# Patient Record
Sex: FEMALE | Race: BLACK OR AFRICAN AMERICAN | Employment: UNEMPLOYED | URBAN - METROPOLITAN AREA
[De-identification: names, ages, dates, MRNs, and addresses within clinical notes are randomized per-mention and may not be internally consistent; named-entity substitution may affect disease eponyms.]

---

## 2017-09-14 ENCOUNTER — HOSPITAL ENCOUNTER (EMERGENCY)
Age: 22
Discharge: HOME | End: 2017-09-14
Attending: EMERGENCY MEDICINE
Payer: COMMERCIAL

## 2017-09-14 VITALS
HEART RATE: 85 BPM | SYSTOLIC BLOOD PRESSURE: 140 MMHG | TEMPERATURE: 97.9 F | OXYGEN SATURATION: 100 % | RESPIRATION RATE: 12 BRPM | DIASTOLIC BLOOD PRESSURE: 95 MMHG

## 2017-09-14 DIAGNOSIS — N76.0 BACTERIAL VAGINOSIS: Primary | ICD-10-CM

## 2017-09-14 DIAGNOSIS — B96.89 BACTERIAL VAGINOSIS: Primary | ICD-10-CM

## 2017-09-14 LAB
-: ABNORMAL
AMORPHOUS: ABNORMAL
BACTERIA: ABNORMAL
BILIRUBIN URINE: NEGATIVE
CASTS UA: ABNORMAL /LPF (ref 0–2)
COLOR: YELLOW
CRYSTALS, UA: ABNORMAL /HPF
DIRECT EXAM: ABNORMAL
EPITHELIAL CELLS UA: ABNORMAL /HPF (ref 0–5)
GLUCOSE URINE: NEGATIVE
HCG(URINE) PREGNANCY TEST: NEGATIVE
KETONES, URINE: ABNORMAL
LEUKOCYTE ESTERASE, URINE: ABNORMAL
Lab: ABNORMAL
MUCUS: ABNORMAL
NITRITE, URINE: NEGATIVE
OTHER OBSERVATIONS UA: ABNORMAL
PH UA: 5.5 (ref 5–8)
PROTEIN UA: NEGATIVE
RBC UA: ABNORMAL /HPF (ref 0–2)
RENAL EPITHELIAL, UA: ABNORMAL /HPF
SPECIFIC GRAVITY UA: 1.03 (ref 1–1.03)
SPECIMEN DESCRIPTION: ABNORMAL
STATUS: ABNORMAL
TRICHOMONAS: ABNORMAL
TURBIDITY: ABNORMAL
URINE HGB: NEGATIVE
UROBILINOGEN, URINE: NORMAL
WBC UA: ABNORMAL /HPF (ref 0–5)
YEAST: ABNORMAL

## 2017-09-14 PROCEDURE — 87660 TRICHOMONAS VAGIN DIR PROBE: CPT

## 2017-09-14 PROCEDURE — 87510 GARDNER VAG DNA DIR PROBE: CPT

## 2017-09-14 PROCEDURE — 87480 CANDIDA DNA DIR PROBE: CPT

## 2017-09-14 PROCEDURE — 87591 N.GONORRHOEAE DNA AMP PROB: CPT

## 2017-09-14 PROCEDURE — 84703 CHORIONIC GONADOTROPIN ASSAY: CPT

## 2017-09-14 PROCEDURE — 81001 URINALYSIS AUTO W/SCOPE: CPT

## 2017-09-14 PROCEDURE — G0382 LEV 3 HOSP TYPE B ED VISIT: HCPCS

## 2017-09-14 PROCEDURE — 87491 CHLMYD TRACH DNA AMP PROBE: CPT

## 2017-09-14 RX ORDER — METRONIDAZOLE 500 MG/1
500 TABLET ORAL 2 TIMES DAILY
Qty: 14 TABLET | Refills: 0 | Status: SHIPPED | OUTPATIENT
Start: 2017-09-14 | End: 2017-09-21

## 2017-09-14 ASSESSMENT — ENCOUNTER SYMPTOMS
COLOR CHANGE: 0
BACK PAIN: 0
DIARRHEA: 0
NAUSEA: 0
ABDOMINAL PAIN: 0
SHORTNESS OF BREATH: 0
COUGH: 0
VOMITING: 0

## 2017-09-15 LAB
C TRACH DNA GENITAL QL NAA+PROBE: NEGATIVE
N. GONORRHOEAE DNA: NEGATIVE

## 2022-09-10 ENCOUNTER — HOSPITAL ENCOUNTER (INPATIENT)
Age: 27
LOS: 2 days | Discharge: HOME | DRG: 560 | End: 2022-09-12
Attending: OBSTETRICS & GYNECOLOGY | Admitting: OBSTETRICS & GYNECOLOGY
Payer: COMMERCIAL

## 2022-09-10 PROBLEM — O36.4XX0 FETAL DEMISE > 22 WEEKS, DELIVERED, CURRENT HOSPITALIZATION: Status: ACTIVE | Noted: 2022-09-10

## 2022-09-10 PROBLEM — O14.90 PREECLAMPSIA: Status: ACTIVE | Noted: 2022-09-10

## 2022-09-10 LAB
ABO/RH: NORMAL
ABSOLUTE EOS #: 0.29 K/UL (ref 0–0.44)
ABSOLUTE IMMATURE GRANULOCYTE: <0.03 K/UL (ref 0–0.3)
ABSOLUTE LYMPH #: 2.49 K/UL (ref 1.1–3.7)
ABSOLUTE MONO #: 0.54 K/UL (ref 0.1–1.2)
ALBUMIN SERPL-MCNC: 3.9 G/DL (ref 3.5–5.2)
ALBUMIN/GLOBULIN RATIO: 1.1 (ref 1–2.5)
ALP BLD-CCNC: 98 U/L (ref 35–104)
ALT SERPL-CCNC: 11 U/L (ref 5–33)
AMPHETAMINE SCREEN URINE: NEGATIVE
ANION GAP SERPL CALCULATED.3IONS-SCNC: 10 MMOL/L (ref 9–17)
ANTIBODY SCREEN: NEGATIVE
ARM BAND NUMBER: NORMAL
AST SERPL-CCNC: 17 U/L
BARBITURATE SCREEN URINE: NEGATIVE
BASOPHILS # BLD: 1 % (ref 0–2)
BASOPHILS ABSOLUTE: 0.04 K/UL (ref 0–0.2)
BENZODIAZEPINE SCREEN, URINE: NEGATIVE
BILIRUB SERPL-MCNC: 0.3 MG/DL (ref 0.3–1.2)
BUN BLDV-MCNC: 6 MG/DL (ref 6–20)
CALCIUM SERPL-MCNC: 9.3 MG/DL (ref 8.6–10.4)
CANNABINOID SCREEN URINE: POSITIVE
CHLORIDE BLD-SCNC: 102 MMOL/L (ref 98–107)
CO2: 24 MMOL/L (ref 20–31)
COCAINE METABOLITE, URINE: NEGATIVE
CREAT SERPL-MCNC: 0.56 MG/DL (ref 0.5–0.9)
CREATININE URINE: 139.7 MG/DL (ref 28–217)
EOSINOPHILS RELATIVE PERCENT: 4 % (ref 1–4)
EXPIRATION DATE: NORMAL
FENTANYL URINE: NEGATIVE
FIBRINOGEN: 407 MG/DL (ref 140–420)
GFR AFRICAN AMERICAN: >60 ML/MIN
GFR NON-AFRICAN AMERICAN: >60 ML/MIN
GFR SERPL CREATININE-BSD FRML MDRD: NORMAL ML/MIN/{1.73_M2}
GLUCOSE BLD-MCNC: 85 MG/DL (ref 70–99)
HCT VFR BLD CALC: 32.5 % (ref 36.3–47.1)
HEMOGLOBIN: 11.3 G/DL (ref 11.9–15.1)
HEPATITIS B SURFACE ANTIGEN: NONREACTIVE
HEPATITIS C ANTIBODY: NONREACTIVE
HIV AG/AB: NONREACTIVE
IMMATURE GRANULOCYTES: 0 %
INR BLD: 0.9
LYMPHOCYTES # BLD: 30 % (ref 24–43)
MCH RBC QN AUTO: 31.7 PG (ref 25.2–33.5)
MCHC RBC AUTO-ENTMCNC: 34.8 G/DL (ref 28.4–34.8)
MCV RBC AUTO: 91.3 FL (ref 82.6–102.9)
METHADONE SCREEN, URINE: NEGATIVE
MONOCYTES # BLD: 7 % (ref 3–12)
NRBC AUTOMATED: 0 PER 100 WBC
OPIATES, URINE: NEGATIVE
OXYCODONE SCREEN URINE: NEGATIVE
PARTIAL THROMBOPLASTIN TIME: 25.6 SEC (ref 20.5–30.5)
PDW BLD-RTO: 14.8 % (ref 11.8–14.4)
PHENCYCLIDINE, URINE: NEGATIVE
PLATELET # BLD: 209 K/UL (ref 138–453)
PMV BLD AUTO: 10.2 FL (ref 8.1–13.5)
POTASSIUM SERPL-SCNC: 4 MMOL/L (ref 3.7–5.3)
PROTHROMBIN TIME: 9.4 SEC (ref 9.1–12.3)
RBC # BLD: 3.56 M/UL (ref 3.95–5.11)
RBC # BLD: ABNORMAL 10*6/UL
REASON FOR REJECTION: NORMAL
RUBV IGG SER QL: 325.8 IU/ML
SEG NEUTROPHILS: 58 % (ref 36–65)
SEGMENTED NEUTROPHILS ABSOLUTE COUNT: 4.88 K/UL (ref 1.5–8.1)
SODIUM BLD-SCNC: 136 MMOL/L (ref 135–144)
T. PALLIDUM, IGG: NONREACTIVE
TEST INFORMATION: ABNORMAL
TOTAL PROTEIN, URINE: 16 MG/DL
TOTAL PROTEIN: 7.5 G/DL (ref 6.4–8.3)
URINE TOTAL PROTEIN CREATININE RATIO: 0.11 (ref 0–0.2)
WBC # BLD: 8.3 K/UL (ref 3.5–11.3)
ZZ NTE CLEAN UP: ORDERED TEST: NORMAL
ZZ NTE WITH NAME CLEAN UP: SPECIMEN SOURCE: NORMAL

## 2022-09-10 PROCEDURE — 86803 HEPATITIS C AB TEST: CPT

## 2022-09-10 PROCEDURE — 86850 RBC ANTIBODY SCREEN: CPT

## 2022-09-10 PROCEDURE — 84156 ASSAY OF PROTEIN URINE: CPT

## 2022-09-10 PROCEDURE — 59200 INSERT CERVICAL DILATOR: CPT

## 2022-09-10 PROCEDURE — 85610 PROTHROMBIN TIME: CPT

## 2022-09-10 PROCEDURE — 80307 DRUG TEST PRSMV CHEM ANLYZR: CPT

## 2022-09-10 PROCEDURE — 6360000002 HC RX W HCPCS

## 2022-09-10 PROCEDURE — 80053 COMPREHEN METABOLIC PANEL: CPT

## 2022-09-10 PROCEDURE — 87389 HIV-1 AG W/HIV-1&-2 AB AG IA: CPT

## 2022-09-10 PROCEDURE — 85025 COMPLETE CBC W/AUTO DIFF WBC: CPT

## 2022-09-10 PROCEDURE — 85730 THROMBOPLASTIN TIME PARTIAL: CPT

## 2022-09-10 PROCEDURE — 96374 THER/PROPH/DIAG INJ IV PUSH: CPT

## 2022-09-10 PROCEDURE — 86901 BLOOD TYPING SEROLOGIC RH(D): CPT

## 2022-09-10 PROCEDURE — 87340 HEPATITIS B SURFACE AG IA: CPT

## 2022-09-10 PROCEDURE — 96375 TX/PRO/DX INJ NEW DRUG ADDON: CPT

## 2022-09-10 PROCEDURE — 86762 RUBELLA ANTIBODY: CPT

## 2022-09-10 PROCEDURE — 6370000000 HC RX 637 (ALT 250 FOR IP): Performed by: STUDENT IN AN ORGANIZED HEALTH CARE EDUCATION/TRAINING PROGRAM

## 2022-09-10 PROCEDURE — 82570 ASSAY OF URINE CREATININE: CPT

## 2022-09-10 PROCEDURE — 86780 TREPONEMA PALLIDUM: CPT

## 2022-09-10 PROCEDURE — 6370000000 HC RX 637 (ALT 250 FOR IP)

## 2022-09-10 PROCEDURE — 96376 TX/PRO/DX INJ SAME DRUG ADON: CPT

## 2022-09-10 PROCEDURE — 85384 FIBRINOGEN ACTIVITY: CPT

## 2022-09-10 PROCEDURE — 86900 BLOOD TYPING SEROLOGIC ABO: CPT

## 2022-09-10 PROCEDURE — 2580000003 HC RX 258

## 2022-09-10 PROCEDURE — 1220000000 HC SEMI PRIVATE OB R&B

## 2022-09-10 RX ORDER — CALCIUM GLUCONATE 94 MG/ML
1000 INJECTION, SOLUTION INTRAVENOUS PRN
Status: DISCONTINUED | OUTPATIENT
Start: 2022-09-10 | End: 2022-09-11

## 2022-09-10 RX ORDER — MISOPROSTOL 100 UG/1
400 TABLET ORAL ONCE
Status: COMPLETED | OUTPATIENT
Start: 2022-09-10 | End: 2022-09-10

## 2022-09-10 RX ORDER — MAGNESIUM SULFATE HEPTAHYDRATE 40 MG/ML
4000 INJECTION, SOLUTION INTRAVENOUS ONCE
Status: COMPLETED | OUTPATIENT
Start: 2022-09-10 | End: 2022-09-10

## 2022-09-10 RX ORDER — SODIUM CHLORIDE, SODIUM LACTATE, POTASSIUM CHLORIDE, CALCIUM CHLORIDE 600; 310; 30; 20 MG/100ML; MG/100ML; MG/100ML; MG/100ML
INJECTION, SOLUTION INTRAVENOUS CONTINUOUS
Status: DISCONTINUED | OUTPATIENT
Start: 2022-09-10 | End: 2022-09-11

## 2022-09-10 RX ORDER — NIFEDIPINE 30 MG/1
30 TABLET, EXTENDED RELEASE ORAL DAILY
Status: DISCONTINUED | OUTPATIENT
Start: 2022-09-10 | End: 2022-09-10

## 2022-09-10 RX ORDER — ONDANSETRON 2 MG/ML
4 INJECTION INTRAMUSCULAR; INTRAVENOUS EVERY 6 HOURS PRN
Status: DISCONTINUED | OUTPATIENT
Start: 2022-09-10 | End: 2022-09-11

## 2022-09-10 RX ORDER — SODIUM CHLORIDE 0.9 % (FLUSH) 0.9 %
5-40 SYRINGE (ML) INJECTION EVERY 12 HOURS SCHEDULED
Status: DISCONTINUED | OUTPATIENT
Start: 2022-09-10 | End: 2022-09-11

## 2022-09-10 RX ORDER — ACETAMINOPHEN 500 MG
1000 TABLET ORAL EVERY 6 HOURS PRN
Status: DISCONTINUED | OUTPATIENT
Start: 2022-09-10 | End: 2022-09-11

## 2022-09-10 RX ORDER — NIFEDIPINE 60 MG/1
60 TABLET, FILM COATED, EXTENDED RELEASE ORAL EVERY 24 HOURS
Status: DISCONTINUED | OUTPATIENT
Start: 2022-09-11 | End: 2022-09-11

## 2022-09-10 RX ORDER — METHYLERGONOVINE MALEATE 0.2 MG/ML
200 INJECTION INTRAVENOUS PRN
Status: CANCELLED | OUTPATIENT
Start: 2022-09-10

## 2022-09-10 RX ORDER — MAGNESIUM SULFATE HEPTAHYDRATE 40 MG/ML
INJECTION, SOLUTION INTRAVENOUS
Status: COMPLETED
Start: 2022-09-10 | End: 2022-09-10

## 2022-09-10 RX ORDER — KETOROLAC TROMETHAMINE 30 MG/ML
30 INJECTION, SOLUTION INTRAMUSCULAR; INTRAVENOUS ONCE
Status: COMPLETED | OUTPATIENT
Start: 2022-09-10 | End: 2022-09-10

## 2022-09-10 RX ORDER — LIDOCAINE HYDROCHLORIDE 10 MG/ML
30 INJECTION, SOLUTION EPIDURAL; INFILTRATION; INTRACAUDAL; PERINEURAL PRN
Status: DISCONTINUED | OUTPATIENT
Start: 2022-09-10 | End: 2022-09-11

## 2022-09-10 RX ORDER — NALBUPHINE HYDROCHLORIDE 20 MG/ML
10 INJECTION, SOLUTION INTRAMUSCULAR; INTRAVENOUS; SUBCUTANEOUS ONCE
Status: COMPLETED | OUTPATIENT
Start: 2022-09-10 | End: 2022-09-10

## 2022-09-10 RX ORDER — CARBOPROST TROMETHAMINE 250 UG/ML
250 INJECTION, SOLUTION INTRAMUSCULAR PRN
Status: CANCELLED | OUTPATIENT
Start: 2022-09-10

## 2022-09-10 RX ORDER — SODIUM CHLORIDE 9 MG/ML
25 INJECTION, SOLUTION INTRAVENOUS PRN
Status: DISCONTINUED | OUTPATIENT
Start: 2022-09-10 | End: 2022-09-11

## 2022-09-10 RX ORDER — SODIUM CHLORIDE 0.9 % (FLUSH) 0.9 %
5-40 SYRINGE (ML) INJECTION PRN
Status: DISCONTINUED | OUTPATIENT
Start: 2022-09-10 | End: 2022-09-11

## 2022-09-10 RX ORDER — MISOPROSTOL 100 UG/1
800 TABLET ORAL PRN
Status: CANCELLED | OUTPATIENT
Start: 2022-09-10

## 2022-09-10 RX ORDER — NIFEDIPINE 10 MG/1
10 CAPSULE ORAL ONCE
Status: COMPLETED | OUTPATIENT
Start: 2022-09-10 | End: 2022-09-10

## 2022-09-10 RX ORDER — TRANEXAMIC ACID 10 MG/ML
1000 INJECTION, SOLUTION INTRAVENOUS
Status: ACTIVE | OUTPATIENT
Start: 2022-09-10 | End: 2022-09-10

## 2022-09-10 RX ORDER — SODIUM CHLORIDE, SODIUM LACTATE, POTASSIUM CHLORIDE, AND CALCIUM CHLORIDE .6; .31; .03; .02 G/100ML; G/100ML; G/100ML; G/100ML
500 INJECTION, SOLUTION INTRAVENOUS PRN
Status: DISCONTINUED | OUTPATIENT
Start: 2022-09-10 | End: 2022-09-11

## 2022-09-10 RX ORDER — SODIUM CHLORIDE, SODIUM LACTATE, POTASSIUM CHLORIDE, AND CALCIUM CHLORIDE .6; .31; .03; .02 G/100ML; G/100ML; G/100ML; G/100ML
1000 INJECTION, SOLUTION INTRAVENOUS PRN
Status: DISCONTINUED | OUTPATIENT
Start: 2022-09-10 | End: 2022-09-11

## 2022-09-10 RX ADMIN — MAGNESIUM SULFATE HEPTAHYDRATE 4000 MG: 40 INJECTION, SOLUTION INTRAVENOUS at 13:33

## 2022-09-10 RX ADMIN — MAGNESIUM SULFATE HEPTAHYDRATE 2000 MG/HR: 40 INJECTION, SOLUTION INTRAVENOUS at 23:40

## 2022-09-10 RX ADMIN — ONDANSETRON 4 MG: 2 INJECTION INTRAMUSCULAR; INTRAVENOUS at 23:13

## 2022-09-10 RX ADMIN — KETOROLAC TROMETHAMINE 30 MG: 30 INJECTION, SOLUTION INTRAMUSCULAR; INTRAVENOUS at 23:42

## 2022-09-10 RX ADMIN — SODIUM CHLORIDE, POTASSIUM CHLORIDE, SODIUM LACTATE AND CALCIUM CHLORIDE: 600; 310; 30; 20 INJECTION, SOLUTION INTRAVENOUS at 13:22

## 2022-09-10 RX ADMIN — SODIUM CHLORIDE, POTASSIUM CHLORIDE, SODIUM LACTATE AND CALCIUM CHLORIDE: 600; 310; 30; 20 INJECTION, SOLUTION INTRAVENOUS at 13:38

## 2022-09-10 RX ADMIN — MISOPROSTOL 400 MCG: 100 TABLET ORAL at 15:18

## 2022-09-10 RX ADMIN — MISOPROSTOL 400 MCG: 100 TABLET ORAL at 19:31

## 2022-09-10 RX ADMIN — MAGNESIUM SULFATE IN WATER 4000 MG: 40 INJECTION, SOLUTION INTRAVENOUS at 13:33

## 2022-09-10 RX ADMIN — NALBUPHINE HYDROCHLORIDE 10 MG: 20 INJECTION, SOLUTION INTRAMUSCULAR; INTRAVENOUS; SUBCUTANEOUS at 20:58

## 2022-09-10 RX ADMIN — MAGNESIUM SULFATE HEPTAHYDRATE 2000 MG/HR: 40 INJECTION, SOLUTION INTRAVENOUS at 13:53

## 2022-09-10 RX ADMIN — NIFEDIPINE 10 MG: 10 CAPSULE ORAL at 13:10

## 2022-09-10 RX ADMIN — NIFEDIPINE 30 MG: 30 TABLET, FILM COATED, EXTENDED RELEASE ORAL at 15:19

## 2022-09-10 RX ADMIN — NALBUPHINE HYDROCHLORIDE 10 MG: 20 INJECTION, SOLUTION INTRAMUSCULAR; INTRAVENOUS; SUBCUTANEOUS at 23:39

## 2022-09-10 RX ADMIN — MISOPROSTOL 400 MCG: 100 TABLET ORAL at 22:31

## 2022-09-10 ASSESSMENT — PAIN SCALES - GENERAL
PAINLEVEL_OUTOF10: 9
PAINLEVEL_OUTOF10: 7
PAINLEVEL_OUTOF10: 9

## 2022-09-10 NOTE — CARE COORDINATION
ANTEPARTUM NOTE    Fetal demise > 22 weeks, delivered, current hospitalization Davonna Mode was admitted to L&D on 9/10/22 for decreased fetal movement @ unknown gestation ( estimated 23W4D)    OB GYN Provider: none       IOL due to IUFD

## 2022-09-10 NOTE — DISCHARGE SUMMARY
MG tablet  Commonly known as: TYLENOL  Take 2 tablets by mouth every 6 hours as needed for Pain     docusate sodium 100 MG capsule  Commonly known as: COLACE  Take 1 capsule by mouth 2 times daily     ibuprofen 600 MG tablet  Commonly known as: ADVIL;MOTRIN  Take 1 tablet by mouth every 6 hours as needed for Pain     NIFEdipine 90 MG extended release tablet  Commonly known as: PROCARDIA XL  Take 1 tablet by mouth daily     nitrofurantoin (macrocrystal-monohydrate) 100 MG capsule  Commonly known as: MACROBID  Take 1 capsule by mouth 2 times daily for 10 days               Where to Get Your Medications        These medications were sent to [address] [address]      Phone: [phone]   acetaminophen 500 MG tablet  docusate sodium 100 MG capsule  ibuprofen 600 MG tablet  NIFEdipine 90 MG extended release tablet  nitrofurantoin (macrocrystal-monohydrate) 100 MG capsule         Activity: pelvic rest x 6 weeks  Diet: regular diet  Follow up: 1 week for BP check    Condition on discharge: stable    Discharge date: 9/12/22    Eliz Iglesias DO  Ob/Gyn Resident    Comments:  Home care and follow-up care were reviewed. Pelvic rest, and birth control were reviewed. Signs and symptoms of mastitis and post partum depression were reviewed. The patient is to notify her physician if any of these occur.

## 2022-09-10 NOTE — PROGRESS NOTES
Labor Progress Note  Resident Interval Magnesium Note    Therese Mcknight is a 32 y.o. female  at Unknown  The patient was seen and examined. The patient is resting comfortably. Her pain is well controlled. Declines anything for pain at this time. She reports fetal movement is absent (demise), denies contractions, denies loss of fluid, denies vaginal bleeding. She denies headache, visual changes, nausea/vomiting, RUQ pain and backache. She denies any shortness of breath or chest pain. She denies change in her extremities, regarding swelling.     Continuous Medications:    lactated ringers 125 mL/hr at 09/10/22 1322    sodium chloride      lactated ringers 75 mL/hr at 09/10/22 1338    sodium chloride      magnesium sulfate 2,000 mg/hr (09/10/22 1353)     Vital Signs:  Vitals:    09/10/22 1715 09/10/22 1720 09/10/22 1721 09/10/22 1932   BP: 129/76  131/77 (!) 161/102   Pulse:   76 86   Resp:    16   Temp:    97.7 °F (36.5 °C)   TempSrc:    Oral   SpO2: 98% 97%  97%     Physical Exam:  FHT: NA demise  Contractions: irregular, every 2-10 minutes    Chaperone for Intimate Exam: Chaperone was present for entire exam, Chaperone Name: Lowell Perkins RN  Cervical Exam: fingertip cm dilated, 0 effaced, -3 station    Chest: clear to auscultation bilaterally  Heart: RRR no murmur  Abdomen: soft, nontender, gravid, no s/s chorio or abruption  Extremities: DTR normal Right: +2/4   Left: +2/4  Clonus: absent    Urine Output: 1200 since admitted; Clear urine    Pitocin: @ 0 mu/min    Membranes: Intact  Scalp Electrode in place: absent  Intrauterine Pressure Catheter in Place: absent    Interventions: SVE    Labs:  Last Magnesium Level:   No results found for: MG    BMP:    Recent Labs     09/10/22  1325      K 4.0      CO2 24   BUN 6   CREATININE 0.56   GLUCOSE 85     Assessment/Plan:  Ferdinand Urena is a 32 y.o. female  at 23w4d IUP with fetal demise confirmed on LBUS on 9/10/22   - GBS unk, No indication

## 2022-09-10 NOTE — PROGRESS NOTES
[facility] [address]  Miscarriage/Fetal Demise Note                                     IUFD       Shift date: 09/10/2022    Shift day: Saturday      Shift # 2                  Room # 3652/8751-36   Name: Ferdinand Urena            Age: 32 y.o. Gender: female          Christianity: [address] of Yazidism: unknown  Admit Date & Time: 9/10/2022 11:04 AM     Referral: Nurse   Actual date of delivery: ***   TOD: ***       SITUATION AT DEATH:  Patient had (miscarriage/fetal demise)    BABY'S GIVEN NAME:  {Blank single:[id number]::\"No name specified\"}     SPIRITUAL ASSESSMENT - INTERVENTION - OUTCOME:   perfect Served requesting on behalf of patient requesting some  support at the fetal demise of an estimated 20 weeks 3days old fetus.  went to the room and engaged the patient. She exhibited some lethargy possibly due to shock. Patient had come to the hospital .  asked if this was a shock; Patient replied that she knew something was wrong but did not expect this. Asked if she had someone to support er, patient spoke of her Ex-, and said he had gone for food.  offered to pray, which was accepted. Staff came in to attend to patient;  asked for just a few moments, which staff graciously granted. Prayer was offered and appreciation expressed.  told patient just to let a nurse know if any more help was needed.  informed staff he had completed his visited.  thanked staff for helping the patient know how things madeline move forward.      NOTE INCOMPLETE AT THIS TIME      HOME:  Name: ***  City: ***  Phone Number: ***    PT's CONTACT INFORMATION:  Name: [facility]  Address: ***   Phone Number: ***    Electronically signed by Emily Lee, on 9/10/2022 at 3:32 PM.  Keenan Wen  [phone]               09/10/22 1421   Encounter Summary Encounter Overview/Reason  Grief, Loss, and Adjustments   Service Provided For: Patient   Referral/Consult From: Nurse   Support System Significant other   Last Encounter  09/10/22   Complexity of Encounter Moderate   Begin Time 1500   End Time  1510   Total Time Calculated 10 min   Encounter    Type Family Care   Spiritual/Emotional needs   Type Spiritual Distress; Emotional Distress   Grief, Loss, and Adjustments   Type Grief and loss;  loss/ Death,    Assessment/Intervention/Outcome   Assessment Anxious; Complicated grieving; Compromised coping; Impaired resilience   Intervention Active listening;Discussed belief system/Mormon practices/alverto;Grief Care;Nurtured Hope;Prayer (assurance of)/[place]   Outcome Acceptance;Comfort

## 2022-09-10 NOTE — PROGRESS NOTES
Labor Progress Note    Therese Webb Friday is a 32 y.o. female  at Unknown  The patient was seen and examined. Her pain is well controlled. She reports fetal movement is absent (demise), denies contractions, denies loss of fluid, denies vaginal bleeding.        Vital Signs:  Vitals:    09/10/22 1440 09/10/22 1450 09/10/22 1500 09/10/22 1510   BP: 131/78 130/74 133/83 (!) 143/86   Pulse: 72 73 77 72   Resp:       Temp:       TempSrc:       SpO2: 98% 97% 98% 98%     FHT: NA, fetal demise  Contractions: none    Chaperone for Intimate Exam: Chaperone was present for entire exam, Chaperone Name: Fabrice Greco RN  Cervical Exam: closed cm dilated, 0 effaced, -3 station  Pitocin: @ 0 mu/min    Membranes: Intact  Scalp Electrode in place: absent  Intrauterine Pressure Catheter in Place: absent    Interventions: SVE, vaginal Cytotec placement    Assessment/Plan:  Adrien Barajas is a 32 y.o. female  at Unknown admitted for IOL 2/2 fetal demise confirmed on LBUS with attending physician   - GBS unk, No indication for GBS prophylaxis   - Afebrile   - Cytotec 400 mcg x1 placed, next at 1900   - Continue to monitor closely     Attending updated and in agreement with plan    Bull Bello MD  Ob/Gyn Resident  9/10/2022, 3:30 PM

## 2022-09-10 NOTE — H&P
[facility]ETRICAL HISTORY [facility]    Date: 9/10/2022       Time: 1:28 PM   Patient Name: Tinnie Meckel     Patient : 1995  Room/Bed: 0153/4844-85    Admission Date/Time: 9/10/2022 11:04 AM      CC: Decreased Fetal Movement     HPI: Tinnie Meckel is a 32 y.o. Cliff Brightly at Unknown who presents from home with decreased fetal movement. The patient reports fetal movement is absent, denies contractions, denies loss of fluid, denies vaginal bleeding. She denies HA, vision changes, SOB, chest pain, Lightheadedness, dizziness, nausea, vomiting. DATING:  LMP: No LMP recorded. Patient is pregnant. Estimated Date of Delivery: None noted.    Based on:no dating available    PREGNANCY RISK FACTORS:  Patient Active Problem List   Diagnosis    Noncompliance    Marijuana abuse    Fetal demise > 22 weeks, delivered, current hospitalization        Steroids Given In This Pregnancy:  no     REVIEW OF SYSTEMS:   Constitutional: negative fever, negative chills, negative weight changes   HEENT: negative visual disturbances, negative headaches, negative dizziness, negative hearing loss  Breast: Negative breast abnormalities, negative breast lumps, negative nipple discharge  Respiratory: negative dyspnea, negative cough, negative SOB  Cardiovascular: negative chest pain,  negative palpitations, negative arrhythmia, negative syncope   Gastrointestinal: negative abdominal pain, negative RUQ pain, negative N/V, negative diarrhea, negative constipation, negative bowel changes, negative heartburn   Genitourinary: negative dysuria, negative hematuria, negative urinary incontinence, negative vaginal discharge, negative vaginal bleeding or spotting  Dermatological: negative rash, negative pruritis, negative mole or other skin changes  Hematologic: negative bruising  Immunologic/Lymphatic: negative recent illness, negative recent sick contact  Musculoskeletal: negative back pain, negative myalgias, negative arthralgias  Neurological:  negative dizziness, negative migraines, negative seizures, negative weakness  Behavior/Psych: negative depression, negative anxiety, negative SI, negative HI    OBSTETRICAL HISTORY:   OB History    Para Term  AB Living   2 1 1 0 0 1   SAB IAB Ectopic Molar Multiple Live Births   0 0 0 0 0 1      # Outcome Date GA Lbr Parker/2nd Weight Sex Delivery Anes PTL Lv   2 Current            1 Term 09/10/15 40w0d  7 lb 3.9 oz (3.286 kg) M CS-LTranv EPI  DEBRA      Complications: Fetal Intolerance      Name: Rubén Payer: Genaro  Apgar5: 9       PAST MEDICAL HISTORY:   has a past medical history of Anemia of pregnancy in third trimester and Rh negative status during pregnancy. PAST SURGICAL HISTORY:   has no past surgical history on file. ALLERGIES:  has No Known Allergies. MEDICATIONS:  Prior to Admission medications    Not on File       FAMILY HISTORY:  family history includes Heart Attack (age of onset: 46) in her father; Heart Disease in her mother; Hypertension in her mother. SOCIAL HISTORY:   reports that she has never smoked. She has never used smokeless tobacco. She reports that she does not drink alcohol and does not use drugs.     VITALS:  Vitals:    09/10/22 1311 09/10/22 1331 09/10/22 1354   BP: (!) 181/118 (!) 154/88 (!) 149/96   Pulse: 69 (!) 106 85   Resp:  18    Temp:  98.3 °F (36.8 °C)    TempSrc:  Oral    SpO2:  98%          PHYSICAL EXAM:  Fetal Heart Monitor:  no heart tones noted    General appearance:  no apparent distress alert and cooperative  HEENT: head atraumatic, normocephalic, moist mucous membranes, trachea midline  Neurologic:  alert, oriented, normal speech, no focal findings or movement disorder noted  Lungs:  No increased work of breathing, good air exchange, clear to auscultation bilaterally, no crackles or wheezing  Heart:  regular rate and rhythm and no murmur, rubs, gallops  Abdomen:  soft, gravid, non-tender, no rebound, guarding, or rigidity, no RUQ or epigastric tenderness, no signs or symptoms of abruption, no signs or symptoms of chorioamnionitis  Extremities:  no calf tenderness, non edematous, no varicosities, full range of motion in all four extremities  Musculoskeletal: Gross strength equal and intact throughout, no gross abnormalities, range of motion normal in hips, knees, shoulders and spine, CVA tenderness: none  Psychiatric: Mood appropriate, normal affect   Rectal Exam: not indicated    LIMITED BEDSIDE US:  Position: Transverse   Placental Location: fundal  Fetal Heart Tones: absent  Fetal Movement: absent  Amniotic Fluid Index/Volume:  adequate 2x2 cm fluid pocket  Estimated Gestational Age:  18w3d    PRENATAL LAB RESULTS:Patient had no prenatal care      ASSESSMENT & PLAN:  Harshil Davenport is a 32 y.o. female  at unknown gestation diagnosed with IUFD on BSUS today with estimated GA of 23w4d   - Patient arriving today with two days of decreased fetal movement   - She states that she has not felt the baby move at all in two days    - She denies any other obstetrical complaints   - She states that she has not had any prenatal care this pregnancy   - She is unsure how far along she is   - BSUS performed with attending physician and no fetal heart tones noted   - Estimated gestational age of 18w3d   - Prenatal labs ordered: prenatal profile, T&S, TPAL, UDS, fibrinogen, PT, PTT, HIV, Hep C   - Patient is rh negative and will need rhogam pp   - She desires genetic testing and fetal autopsy    - Will have patient sign consent forms    - Will plan on IOL with 400 mg Cytotec PV    Hx C/S x 1    Pre E w/ SF   - Patient denies s/s of pre E   - Patient had two severe range blood pressures 15 minutes apart on admission requiring treatment   - Procardia 10 mg given as patient does not have an IV at this time   - Pre E labs ordered   - 4 gram Mg bolus ordered and will continue with 2 g/hr for 24 hr post partum   - Strict I/O   - Will start patient on 30 mg Procardia XL Daily    Noncompliance   - Patient has no prenatal care this pregnancy    THC Use   - Cessation encouraged   - UDS ordered    BMI 30      Patient Active Problem List    Diagnosis Date Noted    Noncompliance 04/24/2015     Priority: High     Prenatals complete  Pt not been seen since 4/24  28 week labs late          Fetal demise > 22 weeks, delivered, current hospitalization 09/10/2022     Priority: Medium    Marijuana abuse 07/12/2015     Priority: Low     THC + 7/12         Plan discussed with Dr. Jez Rogers, who is agreeable. Steroids given this admission: No    Risks, benefits, alternatives and possible complications have been discussed in detail with the patient. Admission, and post admission procedures and expectations were discussed in detail. All questions were answered.     Attending's Name: Dr. Kinza Beck DO  Ob/Gyn Resident  9/10/2022, 1:28 PM

## 2022-09-10 NOTE — FLOWSHEET NOTE
Patient admitted to recovery room 3 with c/o spotting since today and not feeling baby move x2 days. No contractions noted per patient. Patient unsure of due date or first date of LMP. EFM applied with difficulty per writer of finding heart tones. Dr. Kyra Grayson notified.

## 2022-09-11 LAB — MAGNESIUM: 5.9 MG/DL (ref 1.6–2.6)

## 2022-09-11 PROCEDURE — 2500000003 HC RX 250 WO HCPCS

## 2022-09-11 PROCEDURE — 7200000001 HC VAGINAL DELIVERY

## 2022-09-11 PROCEDURE — 6360000002 HC RX W HCPCS: Performed by: STUDENT IN AN ORGANIZED HEALTH CARE EDUCATION/TRAINING PROGRAM

## 2022-09-11 PROCEDURE — 87186 SC STD MICRODIL/AGAR DIL: CPT

## 2022-09-11 PROCEDURE — 1220000000 HC SEMI PRIVATE OB R&B

## 2022-09-11 PROCEDURE — 88307 TISSUE EXAM BY PATHOLOGIST: CPT

## 2022-09-11 PROCEDURE — 3E0P7VZ INTRODUCTION OF HORMONE INTO FEMALE REPRODUCTIVE, VIA NATURAL OR ARTIFICIAL OPENING: ICD-10-PCS

## 2022-09-11 PROCEDURE — 87086 URINE CULTURE/COLONY COUNT: CPT

## 2022-09-11 PROCEDURE — 96374 THER/PROPH/DIAG INJ IV PUSH: CPT

## 2022-09-11 PROCEDURE — 99024 POSTOP FOLLOW-UP VISIT: CPT | Performed by: OBSTETRICS & GYNECOLOGY

## 2022-09-11 PROCEDURE — 87591 N.GONORRHOEAE DNA AMP PROB: CPT

## 2022-09-11 PROCEDURE — 6370000000 HC RX 637 (ALT 250 FOR IP): Performed by: STUDENT IN AN ORGANIZED HEALTH CARE EDUCATION/TRAINING PROGRAM

## 2022-09-11 PROCEDURE — 87088 URINE BACTERIA CULTURE: CPT

## 2022-09-11 PROCEDURE — 6360000002 HC RX W HCPCS

## 2022-09-11 PROCEDURE — 96376 TX/PRO/DX INJ SAME DRUG ADON: CPT

## 2022-09-11 PROCEDURE — 87491 CHLMYD TRACH DNA AMP PROBE: CPT

## 2022-09-11 PROCEDURE — 83735 ASSAY OF MAGNESIUM: CPT

## 2022-09-11 RX ORDER — LABETALOL HYDROCHLORIDE 5 MG/ML
INJECTION, SOLUTION INTRAVENOUS
Status: COMPLETED
Start: 2022-09-11 | End: 2022-09-11

## 2022-09-11 RX ORDER — NIFEDIPINE 30 MG/1
60 TABLET, EXTENDED RELEASE ORAL DAILY
Status: DISCONTINUED | OUTPATIENT
Start: 2022-09-11 | End: 2022-09-12

## 2022-09-11 RX ORDER — ACETAMINOPHEN 500 MG
1000 TABLET ORAL EVERY 6 HOURS PRN
Status: DISCONTINUED | OUTPATIENT
Start: 2022-09-11 | End: 2022-09-12 | Stop reason: HOSPADM

## 2022-09-11 RX ORDER — LABETALOL HYDROCHLORIDE 5 MG/ML
40 INJECTION, SOLUTION INTRAVENOUS ONCE
Status: COMPLETED | OUTPATIENT
Start: 2022-09-11 | End: 2022-09-11

## 2022-09-11 RX ORDER — NIFEDIPINE 60 MG/1
60 TABLET, FILM COATED, EXTENDED RELEASE ORAL EVERY 24 HOURS
Status: DISCONTINUED | OUTPATIENT
Start: 2022-09-11 | End: 2022-09-11

## 2022-09-11 RX ORDER — SIMETHICONE 80 MG
80 TABLET,CHEWABLE ORAL EVERY 6 HOURS PRN
Status: DISCONTINUED | OUTPATIENT
Start: 2022-09-11 | End: 2022-09-12 | Stop reason: HOSPADM

## 2022-09-11 RX ORDER — ONDANSETRON 2 MG/ML
4 INJECTION INTRAMUSCULAR; INTRAVENOUS EVERY 4 HOURS PRN
Status: DISCONTINUED | OUTPATIENT
Start: 2022-09-11 | End: 2022-09-12 | Stop reason: HOSPADM

## 2022-09-11 RX ORDER — BISACODYL 10 MG
10 SUPPOSITORY, RECTAL RECTAL DAILY PRN
Status: DISCONTINUED | OUTPATIENT
Start: 2022-09-11 | End: 2022-09-12 | Stop reason: HOSPADM

## 2022-09-11 RX ORDER — SODIUM CHLORIDE 9 MG/ML
INJECTION, SOLUTION INTRAVENOUS PRN
Status: DISCONTINUED | OUTPATIENT
Start: 2022-09-11 | End: 2022-09-12 | Stop reason: HOSPADM

## 2022-09-11 RX ORDER — DOCUSATE SODIUM 100 MG/1
100 CAPSULE, LIQUID FILLED ORAL 2 TIMES DAILY
Status: DISCONTINUED | OUTPATIENT
Start: 2022-09-11 | End: 2022-09-12 | Stop reason: HOSPADM

## 2022-09-11 RX ORDER — LANOLIN 72 %
OINTMENT (GRAM) TOPICAL PRN
Status: DISCONTINUED | OUTPATIENT
Start: 2022-09-11 | End: 2022-09-12 | Stop reason: HOSPADM

## 2022-09-11 RX ORDER — IBUPROFEN 800 MG/1
800 TABLET ORAL EVERY 8 HOURS PRN
Status: DISCONTINUED | OUTPATIENT
Start: 2022-09-11 | End: 2022-09-12 | Stop reason: HOSPADM

## 2022-09-11 RX ORDER — LABETALOL HYDROCHLORIDE 5 MG/ML
80 INJECTION, SOLUTION INTRAVENOUS ONCE
Status: COMPLETED | OUTPATIENT
Start: 2022-09-11 | End: 2022-09-11

## 2022-09-11 RX ORDER — SODIUM CHLORIDE 0.9 % (FLUSH) 0.9 %
5-40 SYRINGE (ML) INJECTION EVERY 12 HOURS SCHEDULED
Status: DISCONTINUED | OUTPATIENT
Start: 2022-09-11 | End: 2022-09-12 | Stop reason: HOSPADM

## 2022-09-11 RX ORDER — HYDROCORTISONE 25 MG/G
CREAM TOPICAL
Status: DISCONTINUED | OUTPATIENT
Start: 2022-09-11 | End: 2022-09-12 | Stop reason: HOSPADM

## 2022-09-11 RX ORDER — LABETALOL HYDROCHLORIDE 5 MG/ML
20 INJECTION, SOLUTION INTRAVENOUS ONCE
Status: COMPLETED | OUTPATIENT
Start: 2022-09-11 | End: 2022-09-11

## 2022-09-11 RX ORDER — SODIUM CHLORIDE 0.9 % (FLUSH) 0.9 %
5-40 SYRINGE (ML) INJECTION PRN
Status: DISCONTINUED | OUTPATIENT
Start: 2022-09-11 | End: 2022-09-12 | Stop reason: HOSPADM

## 2022-09-11 RX ADMIN — Medication 166.7 ML: at 00:49

## 2022-09-11 RX ADMIN — LABETALOL HYDROCHLORIDE 40 MG: 5 INJECTION, SOLUTION INTRAVENOUS at 01:17

## 2022-09-11 RX ADMIN — Medication 80 MG: at 02:46

## 2022-09-11 RX ADMIN — MAGNESIUM SULFATE HEPTAHYDRATE 2000 MG/HR: 40 INJECTION, SOLUTION INTRAVENOUS at 10:17

## 2022-09-11 RX ADMIN — Medication 40 MG: at 01:17

## 2022-09-11 RX ADMIN — MAGNESIUM SULFATE HEPTAHYDRATE 2000 MG/HR: 40 INJECTION, SOLUTION INTRAVENOUS at 22:39

## 2022-09-11 RX ADMIN — NIFEDIPINE 60 MG: 30 TABLET, FILM COATED, EXTENDED RELEASE ORAL at 02:48

## 2022-09-11 RX ADMIN — Medication 87.3 MILLI-UNITS/MIN: at 01:15

## 2022-09-11 RX ADMIN — LABETALOL HYDROCHLORIDE 80 MG: 5 INJECTION, SOLUTION INTRAVENOUS at 02:46

## 2022-09-11 RX ADMIN — LABETALOL HYDROCHLORIDE 20 MG: 5 INJECTION, SOLUTION INTRAVENOUS at 00:57

## 2022-09-11 RX ADMIN — Medication 20 MG: at 00:57

## 2022-09-11 NOTE — L&D DELIVERY NOTE
Mother's Information      Labor Events     Labor?: Yes  Cervical Ripening:   Now             Vaginal Breech Delivery Note  Department of Obstetrics and Gynecology  [facility]       Patient: Yoli Ford   : 1995  MRN: [id number]   Date of delivery: 22     Pre-operative Diagnosis: Therese Martinez at approximately 23w4d on LBUS  Fetal demise diagnosed on bedside ultrasound on 9/10/22  Preeclampsia with severe features   Transverse presentation  History of  section x1  Noncompliance  BMI 30    Post-operative Diagnosis:  Stillborn infant, breech and Female    Delivering Obstetrician & Assistant(s): Dr. Rani Man, Radha Rain DO, PGY3; Tania Dumont MD, PGY2; Young Nash MD, PGY1    Infant Information: This patient has no babies on file. This patient has no babies on file. Apgar scores: 0 at 1 minute and 0 at 5 minutes. Anesthesia:  none    Application and Delivery:    She was known to be GBS unknown. Patient presented for decreased fetal movement on 9/10/22. She was then induced for intrauterine fetal demise. LBUS confirmed breech presentation. Fetal sacrum visible at the introitus. Fetal butt was delivered with good maternal effort just past the introitus. Fetal feet were swept medially and delivered without difficulty, fetal feet and torso were then supported. Patient pushed with contractions and fetal umbilical cord was then able to be lengthened by 2-3 cm. The delivery of the fetus was facilitated by providing support and guiding the body through the introitus. When the scapulae appeared at the introitus, gloved finger was placed over the fetal shoulder from the back, followed to the humerus, with movement from medial to lateral sweeping the right arm across the chest and out over the perineum. Gentle rotation of the fetal trunk counterclockwise assisted with the delivery of the fetal right arm as well.  Clockwise rotation of the fetal trunk

## 2022-09-11 NOTE — PROGRESS NOTES
Resident Interval Magnesium Sulfate Note    Therese Baldwin is a 32 y.o. female  PPD#1 s/p  with fetal demise   The patient is resting comfortably. She denies headache, visual changes, and abdominal pain in the right upper quadrant. She denies any shortness of breath or chest pain. She denies change in her extremities, regarding swelling.     Continuous Medications:    oxytocin Stopped (22 0500)    sodium chloride      magnesium sulfate 2,000 mg/hr (22 1018)       Vitals:    Vitals:    22 1430 22 1500 22 1600 22 1700   BP: (!) 147/93 (!) 143/83 125/73 137/80   Pulse: 84 74 77 78   Resp:  18 18    Temp:   99.2 °F (37.3 °C)    TempSrc:   Oral    SpO2: 97% 96% 97% 99%         Physical Exam:  Chest: clear to auscultation bilaterally  Heart: RRR no murmur  Abdomen: soft, nontender, nondistended  Extremities: DTR normal Bilateral lower extremities Right: 2/4   Left: 2/4  Clonus: absent    Urine Output: 225 ml//hr over 4 hours; Clear and Yellow urine    Labs:  Last Magnesium Level:   Lab Results   Component Value Date/Time    MG 5.9 2022 12:42 PM       BMP:    Recent Labs     09/10/22  1325      K 4.0      CO2 24   BUN 6   CREATININE 0.56   GLUCOSE 85       ASSESSMENT/PLAN  Therese Baldwin is a 32 y.o. female  PPD# 1 s/p  of demised infant    - Continue Magnesium Sulfate Treatment 2g/hr, off @ 0015 on 22   - No routine Mag levels q6hrs per provider   - Patient had fall earlier and mag level was drawn; mag level 5.9    - BPs intermittently elevated, non severe   - Patient denies any s/s PreE   - UOP adequate   - PreE labs wnl, P/C 0.11 on admission    - Currently controlled on Procardia 60 XL   - Last IV anti-hypertensive Labetalol 20, 40, 80 last  @ 0246   - Continue to monitor closely     Ellie Prom, DO  Ob/Gyn Resident  2022, 6:13 PM

## 2022-09-11 NOTE — FLOWSHEET NOTE
Dr. Elijah Menjivar notified of patient fall and assessment of reflexes. STAT mag level ordered, Dr. Elijah Menjivar to evaluate patient.

## 2022-09-11 NOTE — PROGRESS NOTES
Obstetric/Gynecology Resident Interval Note    Severe range BP noted. Patient received IV labetalol 20, 40, 80 x1. Decision was made to give Procardia 60 XL earlier than 0700. BP no longer severe range after interventions.      Vitals:    09/11/22 0445 09/11/22 0500 09/11/22 0530 09/11/22 0600   BP: 138/86 (!) 134/95 (!) 140/87 125/84   Pulse: 76 76 73 77   Resp: 16 16 16 16   Temp:    98.3 °F (36.8 °C)   TempSrc:    Oral   SpO2: 96% 96% 99% 98%     Senior resident and attending updated and in agreement with above plan    Vamsi Sal MD  OB/GYN Resident, PGY2  [facility] [place]  9/11/2022, 12:55 AM

## 2022-09-11 NOTE — FLOWSHEET NOTE
Patient found on ground while writer bringing pt  toiletries to room. Patient states she was walking to bathroom and her legs gave out. Reflexes normal, assessed by another RN (Jorge Roa.) Patient assisted to bathroom and back to bed. Will notify residents.

## 2022-09-11 NOTE — PROGRESS NOTES
Resident Interval Magnesium Sulfate Note    Therese Aranda is a 32 y.o. female  PPD# 0 s/p  of demised infant  The patient is resting comfortably. She denies headache, visual changes, abdominal pain in the right upper quadrant, nausea/vomiting, backache, and dysuria. She denies any shortness of breath or chest pain. She denies change in her extremities, regarding swelling. Continuous Medications:    oxytocin Stopped (22 0500)    sodium chloride      magnesium sulfate 2,000 mg/hr (22 0546)     Vitals:    Vitals:    22 0430 22 0445 22 0500 22 0530   BP: (!) 134/90 138/86 (!) 134/95 (!) 140/87   Pulse: 75 76 76 73   Resp: 17 16 16 16   Temp:       TempSrc:       SpO2: 97% 96% 96% 99%     Physical Exam:  Chest: clear to auscultation bilaterally  Heart: RRR no murmur  Abdomen: soft, nontender, nondistended  Extremities: DTR normal Bilateral lower extremities Right: +2/4   Left: +2/4  Clonus: absent    Urine Output: 300 mL between 3449-4924; Clear urine    Labs:  Last Magnesium Level:   No results found for: MG    BMP:    Recent Labs     09/10/22  1325      K 4.0      CO2 24   BUN 6   CREATININE 0.56   GLUCOSE 85     ASSESSMENT/PLAN  Therese Aranda is a 32 y.o. female  PPD# 0 s/p  of demised infant   - Continue Magnesium Sulfate Treatment 2g/hr, off @ 0015 on 22   - No Mag levels q6hrs per provider   - BPs intermittently elevated, last severe range was @ 0151   - Patient denies any s/s PreE   - UOP previously adequate. Patient has not felt the urge to void since 0230   - PreE labs wnl, P/C 0.11 (9/10)    - Previously on Procardia 30 XL.  Increased to 60 XL qd   - S/p Procardia 10 x1 (last 9/10 @ 1310) and labetalol 20, 40, 80 IV x1 (last  @ 0246)    - Continue to monitor closely     Alejandra Wilson MD  Ob/Gyn Resident  2022, 6:09 AM        Attending Physician Statement  I have discussed the care of 5 [facility], including pertinent

## 2022-09-11 NOTE — PROGRESS NOTES
Labor Progress Note    Therese Arias is a 32 y.o. female  at Unknown  The patient was seen and examined. Her pain is not well controlled. Patient feeling more pressure and pain with contractions. Patient also complaining of leakage of fluid. She reports fetal movement is absent (demise), complains of contractions, complains of loss of fluid, denies vaginal bleeding. Vital Signs:  Vitals:    09/10/22 2300 09/10/22 2310 09/10/22 2315 09/10/22 2320   BP: (!) 149/102      Pulse: 77      Resp: 16      Temp: 97.8 °F (36.6 °C)      TempSrc: Oral      SpO2: 96% 98% 96% 98%     FHT: NA, demise  Contractions: irregular    Chaperone for Intimate Exam: Chaperone was present for entire exam, Chaperone Name: Mukund Reyes, Pe[place]  Cervical Exam: patient did not tolerate well, possibly 5-6cm   Pitocin: @ 0 mu/min    Membranes: Ruptured dark colored fluid  Scalp Electrode in place: absent  Intrauterine Pressure Catheter in Place: absent    Interventions: SVE, patient did not tolerate well    Assessment/Plan:  Kavitha Rai is a 32 y.o. female  at Unknown admitted for approximately 23w4d IUP with fetal demise confirmed on LBUS on 9/10/22             - GBS unk, No indication for GBS prophylaxis             - Afebrile   - SROM (brown colored) @ 2300             - S/p Cytotec 400 mcg PV x2, 400 mcg BU x1   - Patient does not desire an epidural at this time.  Requesting IV pain medications  - She is S/p nubain x1, another dose ordered  - Toradol 30 IV x1 also ordered  - LBUS performed and fetus still appears to be in breech presentation with large retroplacental hemorrhage/suspected abruption  - Continue to monitor closely and anticipate delivery     Attending updated and in agreement with plan    Aditya Stubbs MD  Ob/Gyn Resident  9/10/2022, 11:38 PM

## 2022-09-11 NOTE — PROGRESS NOTES
[facility] CARE DEPARTMENT - Cayden King 83  Miscarriage/Fetal Demise Note                                     IUFD       Shift date: 2022  Shift day: Saturday      Shift # 3                  Room # 9228/5953-23   Name: Magaly Rosenthal            Age: 32 y.o. Gender: female          Moravian:    Place of Alevism:   Admit Date & Time: 9/10/2022 11:04 AM     Referral:    Actual date of delivery: 2022   TOD: 0015       SITUATION AT DEATH:  Patient had (miscarriage/fetal demise) approximately 23 weeks 4 days. BABY'S GIVEN NAME:  No name specified     SPIRITUAL ASSESSMENT - INTERVENTION - OUTCOME:   following up from previous shift.  went to room 701.  prayed with mother Abdiaziz Barajas.  gave patient grief packet, sympathy card sent, reading material on miscarriages given to patient. Nurse gave  partially completed Release of Body Form. Mother has yet to select  home.      HOME:  Name: Yet to be selected    City:   Phone Number:     PT's CONTACT INFORMATION:  Name:  Abdiaziz Barajas  Address: [address] [place] [address], [address]   Phone Number: [phone]    Electronically signed by Miguel Cortes on 2022 at [address] [facility]  [phone]

## 2022-09-11 NOTE — PROGRESS NOTES
Obstetric/Gynecology Resident Interval Note/Mag Note    Patient seen and examined after being notified by RN that patient fell when she got up to use the restroom on her own while on mag. The patient reports that she did not call out for help from the RN prior to getting up to go to the bathroom. Discussed that she should have a nurse present due to being on mag. The patient reports she fell on her left knee and was able to get back up. Denies any lacerations. Did not hit her head. Left knee with abrasion that is not bleeding. Strength and sensation intact bilaterally. Bilateral upper and lower extremity reflex 2/4. Clonus absent. Mag level pending. Patient voided 1200 mL over 6 hours. Patient denies any headache, visual changes, difficulty breathing, RUQ pain, N/V, F/C, and pain/swelling in lower extremities. Continue current treatment with mag sulfate at 2g/hr due off at midnight on 9/12. Continue Procardia 60 XL and monitor for need for increasing BP meds.        Luna Gonzalez DO  OB/GYN Resident, [address] [place] [address]  9/11/2022, 2:00 PM

## 2022-09-11 NOTE — PROGRESS NOTES
Labor Progress Note  Resident Interval Magnesium Note    Therese RIBERA Misti Stanley is a 32 y.o. female  at Unknown  The patient was seen and examined. The patient is resting comfortably. Her pain is well controlled after receiving nubain x1. She reports fetal movement is absent (demise), complains of contractions, denies loss of fluid, denies vaginal bleeding. She denies headache, visual changes, abdominal pain in the right upper quadrant, nausea/vomiting, backache, and dysuria. She denies any shortness of breath or chest pain. She denies change in her extremities, regarding swelling.     Continuous Medications:    lactated ringers 75 mL/hr at 09/10/22 1338    sodium chloride      lactated ringers 75 mL/hr at 09/10/22 2014    sodium chloride      magnesium sulfate 2,000 mg/hr (09/10/22 1353)     Vital Signs:  Vitals:    09/10/22 2113 09/10/22 2115 09/10/22 2131 09/10/22 2201   BP: (!) 164/102  (!) 148/82 (!) 146/95   Pulse: 91  87 83   Resp: 16  16 17   Temp:       TempSrc:       SpO2: 96% 95% 96% 96%     Physical Exam:  FHT: NA, demise  Contractions: irregular    Chaperone for Intimate Exam: NA  Cervical Exam: deferred    Chest: clear to auscultation bilaterally  Heart: RRR no murmur  Abdomen: soft, nontender, gravid, no s/s chorio or abruption  Extremities: DTR normal Right: +2/4   Left: +2/4  Clonus: absent    Urine Output: 450cc between 5632-1438; Clear urine    Pitocin: @ 0 mu/min    Membranes: Intact  Scalp Electrode in place: absent  Intrauterine Pressure Catheter in Place: absent    Interventions: none    Labs:  Last Magnesium Level:   No results found for: MG    BMP:    Recent Labs     09/10/22  1325      K 4.0      CO2 24   BUN 6   CREATININE 0.56   GLUCOSE 85     Assessment/Plan:  Harshil Davenport is a 32 y.o. female  at approximately 23w4d IUP with fetal demise confirmed on LBUS on 9/10/22             - GBS unk, No indication for GBS prophylaxis             - Afebrile             -

## 2022-09-12 VITALS
SYSTOLIC BLOOD PRESSURE: 140 MMHG | RESPIRATION RATE: 14 BRPM | TEMPERATURE: 98.9 F | OXYGEN SATURATION: 98 % | DIASTOLIC BLOOD PRESSURE: 100 MMHG | HEART RATE: 78 BPM

## 2022-09-12 PROBLEM — O45.90 PLACENTAL ABRUPTION: Status: ACTIVE | Noted: 2022-09-12

## 2022-09-12 PROBLEM — Z98.891 H/O CESAREAN SECTION: Status: ACTIVE | Noted: 2022-09-12

## 2022-09-12 PROBLEM — O36.4XX0 FETAL DEMISE > 22 WEEKS, DELIVERED, CURRENT HOSPITALIZATION: Status: RESOLVED | Noted: 2022-09-10 | Resolved: 2022-09-12

## 2022-09-12 LAB
C. TRACHOMATIS DNA ,URINE: NEGATIVE
CULTURE: ABNORMAL
FETAL SCREEN: NORMAL
N. GONORRHOEAE DNA, URINE: NEGATIVE
SPECIMEN DESCRIPTION: ABNORMAL
SPECIMEN DESCRIPTION: NORMAL

## 2022-09-12 PROCEDURE — 36415 COLL VENOUS BLD VENIPUNCTURE: CPT

## 2022-09-12 PROCEDURE — 6370000000 HC RX 637 (ALT 250 FOR IP): Performed by: STUDENT IN AN ORGANIZED HEALTH CARE EDUCATION/TRAINING PROGRAM

## 2022-09-12 PROCEDURE — 96372 THER/PROPH/DIAG INJ SC/IM: CPT

## 2022-09-12 PROCEDURE — 6360000002 HC RX W HCPCS: Performed by: STUDENT IN AN ORGANIZED HEALTH CARE EDUCATION/TRAINING PROGRAM

## 2022-09-12 PROCEDURE — 2580000003 HC RX 258: Performed by: STUDENT IN AN ORGANIZED HEALTH CARE EDUCATION/TRAINING PROGRAM

## 2022-09-12 PROCEDURE — 85461 HEMOGLOBIN FETAL: CPT

## 2022-09-12 RX ORDER — DOCUSATE SODIUM 100 MG/1
100 CAPSULE, LIQUID FILLED ORAL 2 TIMES DAILY
Qty: 60 CAPSULE | Refills: 1 | Status: SHIPPED | OUTPATIENT
Start: 2022-09-12

## 2022-09-12 RX ORDER — NITROFURANTOIN 25; 75 MG/1; MG/1
100 CAPSULE ORAL 2 TIMES DAILY
Qty: 20 CAPSULE | Refills: 0 | Status: SHIPPED | OUTPATIENT
Start: 2022-09-12 | End: 2022-09-22

## 2022-09-12 RX ORDER — NIFEDIPINE 90 MG/1
90 TABLET, EXTENDED RELEASE ORAL DAILY
Qty: 90 TABLET | Refills: 1 | Status: SHIPPED | OUTPATIENT
Start: 2022-09-12 | End: 2022-10-12

## 2022-09-12 RX ORDER — NIFEDIPINE 30 MG/1
90 TABLET, EXTENDED RELEASE ORAL DAILY
Status: DISCONTINUED | OUTPATIENT
Start: 2022-09-12 | End: 2022-09-12 | Stop reason: HOSPADM

## 2022-09-12 RX ORDER — NIFEDIPINE 30 MG/1
30 TABLET, EXTENDED RELEASE ORAL ONCE
Status: COMPLETED | OUTPATIENT
Start: 2022-09-12 | End: 2022-09-12

## 2022-09-12 RX ORDER — ACETAMINOPHEN 500 MG
1000 TABLET ORAL EVERY 6 HOURS PRN
Qty: 30 TABLET | Refills: 1 | Status: SHIPPED | OUTPATIENT
Start: 2022-09-12

## 2022-09-12 RX ORDER — IBUPROFEN 600 MG/1
600 TABLET ORAL EVERY 6 HOURS PRN
Qty: 40 TABLET | Refills: 1 | Status: SHIPPED | OUTPATIENT
Start: 2022-09-12

## 2022-09-12 RX ADMIN — SODIUM CHLORIDE, PRESERVATIVE FREE 10 ML: 5 INJECTION INTRAVENOUS at 09:02

## 2022-09-12 RX ADMIN — NIFEDIPINE 30 MG: 30 TABLET, FILM COATED, EXTENDED RELEASE ORAL at 00:55

## 2022-09-12 RX ADMIN — NIFEDIPINE 90 MG: 30 TABLET, FILM COATED, EXTENDED RELEASE ORAL at 09:02

## 2022-09-12 RX ADMIN — HUMAN RHO(D) IMMUNE GLOBULIN 300 MCG: 300 INJECTION, SOLUTION INTRAMUSCULAR at 10:59

## 2022-09-12 RX ADMIN — DOCUSATE SODIUM 100 MG: 100 CAPSULE ORAL at 00:24

## 2022-09-12 RX ADMIN — DOCUSATE SODIUM 100 MG: 100 CAPSULE ORAL at 09:02

## 2022-09-12 NOTE — PROGRESS NOTES
Obstetric/Gynecology Resident Interval Note    Notified by RN of persistently elevated blood pressures despite treatment with Procardia 60 XL daily. Patient is status post 24 hours mag. Denies signs and symptoms of preeclampsia. Will give 30 XL of Procardia right now. And start patient on 90 XL at 09 100 in the a.m.       Vitals:    09/11/22 1800 09/11/22 2200 09/11/22 2300 09/12/22 0000   BP: (!) 144/90 (!) 142/91 (!) 143/91 (!) 152/106   Pulse: 86 77 75 90   Resp: 16      Temp:       TempSrc:       SpO2: 97% 96% 95% 97%         Karolina Jones DO  OB/GYN Resident, PGY3  [facility]  9/12/2022, 12:38 AM

## 2022-09-12 NOTE — FLOWSHEET NOTE
Patient given discharge instructions, supportive measures for  bereavement and postpartum care following loss. Patient ambulates to leave L&D per self.

## 2022-09-12 NOTE — PROGRESS NOTES
POST PARTUM DAY # 1     Therese Mondragon is a 32 y.o. female  This patient was seen & examined today.  with fetal demise on 22    Her pregnancy was complicated by:   Patient Active Problem List   Diagnosis    Noncompliance    Marijuana abuse    Fetal demise > 22 weeks, delivered, current hospitalization    PreE w/ SF (G2)     Fetal Demise 22 F Apg 0/0 Wt **       Today she is doing well without any chief complaint. Her lochia is light. She denies chest pain, shortness of breath, headache, lightheadedness and blurred vision. She is ambulating well. Her voiding pattern is normal. I reviewed signs and symptoms of post partum depression with the patient, she currently denies any of these symptoms. She is tolerating solids.      Vital Signs:  Vitals:    22 2300 22 0000 22 0058 22 0100   BP: (!) 143/91 (!) 152/106 128/87    Pulse: 75 90 80    Resp: 16 15     Temp:       TempSrc:       SpO2: 95% 97%  95%         Physical Exam:  General:  no apparent distress, alert and cooperative  Neurologic:  alert, oriented, normal speech, no focal findings or movement disorder noted  Lungs:  No increased work of breathing, good air exchange, clear to auscultation bilaterally, no crackles or wheezing  Heart:  Normal apical impulse, regular rate and rhythm, normal S1 and S2, no S3 or S4, and no murmur noted    Abdomen: abdomen soft, non-distended, non-tender  Fundus: non-tender, firm, below umbilicus  Extremities:  no calf tenderness, non edematous    Lab:  Lab Results   Component Value Date    HGB 11.3 (L) 09/10/2022     Lab Results   Component Value Date    HCT 32.5 (L) 09/10/2022       Assessment/Plan:  Therese Mondragon is a  PPD # 1 s/p  with fetal demise   - Doing well, VSS   - Fetal demise: baby at bedside    - Encourage ambulation   - Tylenol/motrin for pain control    - Labs if symptomatic  Rh negative/Rubella immune   - Rhogam indicated and pending   PreE w/ SF (BP)   - BP elevated overnight, Normotensive at this time    - S/p Magnesium sulfate infusion    - Procardia XL increased to 90 XL starting this morning    - Continue Procardia XL 90 mg daily upon discharge    - Last IV antihypertensive on 9/11 @ 0246   - Clinically asymptomatic, no s/s of PreE at this time    - PreE labs wnl, P/C 0.11 (9/10)   - Continue to monitor closely   THC use    - UDS positive on admission    - SW consulted and pending   BMI 30  Continue post partum care    Counseling Completed:  Secondary Smoke risks and Sudden Infant Death Syndrome were reviewed with recommendations. Infant sleeping, \"back to sleep\" and avoidance of co-sleeping recommendations were reviewed. Signs and Symptoms of Post Partum Depression were reviewed. The patient is to call if any occur. Signs and symptoms of Mastitis were reviewed. The patient is to call if any occur for follow up.   Discharge instructions including pelvic rest, no driving with pain medicine and office follow-up were reviewed with patient     Attending Physician: Dr. Warren Araiza MD  Ob/Gyn Resident   9/12/2022, 6:31 AM

## 2022-09-12 NOTE — PROGRESS NOTES
Resident Interval Magnesium Sulfate Note    Therese Carroll is a 32 y.o. female  PPD# 0 s/p  with fetal demise   The patient is resting comfortably. She denies headache, visual changes, and abdominal pain in the right upper quadrant. She denies any shortness of breath or chest pain. She denies change in her extremities, regarding swelling.     Continuous Medications:    oxytocin Stopped (22 0500)    sodium chloride      magnesium sulfate 2,000 mg/hr (22 2239)       Vitals:    Vitals:    22 1600 22 1700 22 1800 22 2200   BP: 125/73 137/80 (!) 144/90 (!) 142/91   Pulse: 77 78 86 77   Resp: 18 16 16    Temp: 99.2 °F (37.3 °C)      TempSrc: Oral      SpO2: 97% 99% 97% 96%     Physical Exam:  Chest: clear to auscultation bilaterally  Heart: RRR no murmur  Abdomen: soft, nontender, nondistended  Extremities: DTR normal Bilateral lower extremities Right: 2/4   Left: 2/4  Clonus: absent    Urine Output: adequate     Labs:  Last Magnesium Level:   Lab Results   Component Value Date/Time    MG 5.9 2022 12:42 PM       BMP:    Recent Labs     09/10/22  1325      K 4.0      CO2 24   BUN 6   CREATININE 0.56   GLUCOSE 85       ASSESSMENT/PLAN  Therese Carroll is a 32 y.o. female  PPD# 0 s/p    - Continue Magnesium Sulfate Treatment 2 g/hr, off @ 0015 on 22   - BPs elevated, but nonsevere    - Patient denies any s/s PreE   - UOP adequate    - PreE labs wnl, P/C 0.11 (9/10)   - Currently controlled on Procardia XL 60mg qD    - Last IV anti-hypertensive: Labetalol 80 mg on  @ 0246   - Continue to monitor closely     Peewee Lopez MD  Ob/Gyn Resident  2022, 11:35 PM

## 2022-09-13 LAB
BLD PROD TYP BPU: NORMAL
STATUS OF UNITS: NORMAL
SURGICAL PATHOLOGY REPORT: NORMAL
TRANSFUSION STATUS: NORMAL
UNIT DIVISION: 0
UNIT NUMBER: NORMAL

## 2022-09-13 NOTE — PROGRESS NOTES
OB Attending    In to see Therese and her partner to offer my condolences and advise her of outpatient resources through [facility]. Therese was appropriately grieving and grateful for the support. Her plan was to call [facility] this morning as she did not know what to tell her 9year old son. Her partner was grieving the loss of this little girl and shared that he had lost another little girl to a congenital heart defect. I advised both of them that our team was here to support them through this devastating outcome for the many years it would take them both to process this tragedy. We reviewed that, while we could never be absolutely certain, the most likely etiology of the fetal demise was abruption and that hypertension may have contributed to the abruption. We discussed that they would take time to heal and decide what they wanted to pursue as a family and that we would meet for a debrief/ possible preconception appointment when they were ready.

## 2023-04-20 ENCOUNTER — TELEPHONE (OUTPATIENT)
Dept: OBGYN | Age: 28
End: 2023-04-20

## 2023-04-20 DIAGNOSIS — Z32.01 POSITIVE PREGNANCY TEST: Primary | ICD-10-CM

## 2023-04-20 NOTE — TELEPHONE ENCOUNTER
Patient had a positive pregnancy test at [facility] and is requesting [facility]. MFM referral placed for dating and viability, LMP 1/8/23.